# Patient Record
Sex: MALE | Race: WHITE
[De-identification: names, ages, dates, MRNs, and addresses within clinical notes are randomized per-mention and may not be internally consistent; named-entity substitution may affect disease eponyms.]

---

## 2019-11-11 LAB
ANION GAP SERPL CALC-SCNC: 12.5 MMOL/L (ref 8–16)
BASOPHILS # BLD AUTO: 0 10*3/UL (ref 0–0.1)
BASOPHILS NFR BLD AUTO: 0.3 % (ref 0–1)
BUN SERPL-MCNC: 14 MG/DL (ref 7–26)
BUN/CREAT SERPL: 12 (ref 6–25)
CALCIUM SERPL-MCNC: 9.9 MG/DL (ref 8.4–10.2)
CHLORIDE SERPL-SCNC: 101 MMOL/L (ref 98–107)
CO2 SERPL-SCNC: 28 MMOL/L (ref 22–29)
DEPRECATED NEUTROPHILS # BLD AUTO: 4.1 10*3/UL (ref 2.1–6.9)
EGFRCR SERPLBLD CKD-EPI 2021: > 60 ML/MIN (ref 60–?)
EOSINOPHIL # BLD AUTO: 0.1 10*3/UL (ref 0–0.4)
EOSINOPHIL NFR BLD AUTO: 0.9 % (ref 0–6)
ERYTHROCYTE [DISTWIDTH] IN CORD BLOOD: 12.6 % (ref 11.7–14.4)
GLUCOSE SERPLBLD-MCNC: 88 MG/DL (ref 74–118)
HCT VFR BLD AUTO: 47.3 % (ref 38.2–49.6)
HGB BLD-MCNC: 15.7 G/DL (ref 14–18)
LYMPHOCYTES # BLD: 1.6 10*3/UL (ref 1–3.2)
LYMPHOCYTES NFR BLD AUTO: 24.3 % (ref 18–39.1)
MCH RBC QN AUTO: 31.4 PG (ref 28–32)
MCHC RBC AUTO-ENTMCNC: 33.2 G/DL (ref 31–35)
MCV RBC AUTO: 94.6 FL (ref 81–99)
MONOCYTES # BLD AUTO: 0.7 10*3/UL (ref 0.2–0.8)
MONOCYTES NFR BLD AUTO: 10.9 % (ref 4.4–11.3)
NEUTS SEG NFR BLD AUTO: 63.1 % (ref 38.7–80)
PLATELET # BLD AUTO: 229 X10E3/UL (ref 140–360)
POTASSIUM SERPL-SCNC: 4.5 MMOL/L (ref 3.5–5.1)
RBC # BLD AUTO: 5 X10E6/UL (ref 4.3–5.7)
SODIUM SERPL-SCNC: 137 MMOL/L (ref 136–145)

## 2019-11-12 NOTE — DIAGNOSTIC IMAGING REPORT
EXAMINATION:  CHEST 2 VIEWS    



INDICATION: Pre-operative



COMPARISON: None

     

FINDINGS:



LINES/TUBES:None



LUNGS:The lungs are well-inflated. No focal consolidation or pulmonary edema.



PLEURA:No pleural effusion or pneumothorax.



MEDIASTINUM:The cardiomediastinal silhouette appears normal in size and shape.

Atherosclerotic calcifications of the thoracic aorta.



BONES/SOFT TISSUES:No acute osseous injury.



ABDOMEN:No free air under the diaphragm.





IMPRESSION: 

No focal pneumonia or pulmonary edema.



Signed by: Geri Pitts MD on 11/12/2019 12:10 PM

## 2019-11-15 ENCOUNTER — HOSPITAL ENCOUNTER (OUTPATIENT)
Dept: HOSPITAL 88 - OR | Age: 69
Discharge: HOME | End: 2019-11-15
Attending: SURGERY
Payer: COMMERCIAL

## 2019-11-15 VITALS — DIASTOLIC BLOOD PRESSURE: 84 MMHG | SYSTOLIC BLOOD PRESSURE: 134 MMHG

## 2019-11-15 DIAGNOSIS — Z01.810: ICD-10-CM

## 2019-11-15 DIAGNOSIS — Z01.811: ICD-10-CM

## 2019-11-15 DIAGNOSIS — Z01.812: ICD-10-CM

## 2019-11-15 DIAGNOSIS — K40.90: Primary | ICD-10-CM

## 2019-11-15 PROCEDURE — 71046 X-RAY EXAM CHEST 2 VIEWS: CPT

## 2019-11-15 PROCEDURE — 80048 BASIC METABOLIC PNL TOTAL CA: CPT

## 2019-11-15 PROCEDURE — 36415 COLL VENOUS BLD VENIPUNCTURE: CPT

## 2019-11-15 PROCEDURE — 85025 COMPLETE CBC W/AUTO DIFF WBC: CPT

## 2019-11-15 PROCEDURE — 93005 ELECTROCARDIOGRAM TRACING: CPT

## 2019-11-15 PROCEDURE — 49505 PRP I/HERN INIT REDUC >5 YR: CPT

## 2019-11-15 NOTE — XMS REPORT
Patient Summary Document

                             Created on: 11/15/2019



DANIEL CRUZ

External Reference #: [id number]

: 1950

Sex: Male



Demographics







                          Address                   [id number] [place] [id number]

 

                          Home Phone                [phone]

 

                          Preferred Language        Unknown

 

                          Marital Status            Unknown

 

                          Congregational Affiliation     Unknown

 

                          Race                      Unknown

 

                                        Additional Race(s)  

 

                          Ethnic Group              Unknown





Author







                          Author                    [facility]

 

                          [facility]

 

                          Address                   Unknown

 

                          Phone                     Unavailable







Care Team Providers







                    Care Team Member Name    Role                Phone

 

                    JAILENE PETTIT    Unavailable         Unavailable







Problems

This patient has no known problems.



Allergies, Adverse Reactions, Alerts

This patient has no known allergies or adverse reactions.



Medications

This patient has no known medications.



Results







           Test Description    Test Time    Test Comments    Text Results    Atomic Results    Result

 Comments

 

                CHEST 2 VIEWS    2019 12:10:00                                                             

                                             [place] [address]  
   Patient Name: DANIEL CRUZ                                   MR #: 
[id number]                     : 1950                                  
Age/Sex: 69/M  Acct #: [id number]                              Req #: [id number]
[id number]  Adm Physician:                                                      
Ordered by: SERGO PETTIT MD                            Report #: [id number]
       Location: OR                                      Room/Bed:              
      
___________________________________________________________________________________________________
   Procedure: [id number] DX/CHEST 2 VIEWS  Exam Date:                            
Exam Time:                                               REPORT STATUS: Signed  
 EXAMINATION:  CHEST 2 VIEWS          INDICATION: Pre-operative      COMPARISON:
None           FINDINGS:      LINES/TUBES:None      LUNGS:The lungs are well-
inflated. No focal consolidation or pulmonary edema.      PLEURA:No pleural 
effusion or pneumothorax.      MEDIASTINUM:The cardiomediastinal silhouette 
appears normal in size and shape.   Atherosclerotic calcifications of the 
thoracic aorta.      BONES/SOFT TISSUES:No acute osseous injury.      ABDOMEN:No
free air under the diaphragm.         IMPRESSION:    No focal pneumonia or 
pulmonary edema.      Signed by: Lm Pitts MD on 2019 12:10 PM        
Dictated By: LM PITTS MD  Electronically Signed By: LM PITTS MD on 19 
1210  Transcribed By: HERMELINDA on 19 1210       COPY TO:   SERGO PETTIT MD

## 2019-11-15 NOTE — OPERATIVE REPORT
DATE OF PROCEDURE:  11/15/2019

 

SURGEON:  Dayton Jiménez MD

 

PREOPERATIVE DIAGNOSIS:  Left inguinal hernia.

 

POSTOPERATIVE DIAGNOSIS:  Left inguinal hernia.

 

OPERATION PERFORMED:  Repair of left inguinal hernia with large Prolene hernia system.

 

ASSISTANT:  XAVIER Carl.

 

ANESTHESIA:  General endotracheal.

 

COMPLICATIONS:  None.

 

ESTIMATED BLOOD LOSS:  Minimal.

 

DESCRIPTION OF PROCEDURE:  With the patient lying in bed in the supine position under

good general anesthesia, the abdomen was prepped with Betadine solution and draped in

the usual manner.  A left inguinal incision was made and was carried down through the

subcutaneous tissue down to the external oblique aponeurosis.  External oblique was then

opened along the length of its fibers and external inguinal ring was opened.  The cord

was then mobilized and retracted contained within the cord was a large indirect hernia

sac, which was  from the cord structures ligated with a 2-0 silk suture

ligature and a 2-0 silk tie.  The excess was resected.  The preperitoneal space was then

entered through the internal ring and a pocket was created without any difficulty.  A

large Prolene hernia system was then placed in the preperitoneal space and the underlay

patch was deployed without any problems.  The overlay patch was then placed over the

floor and split inferolaterally to allow for passage of the cord.  The mesh was then

sutured to the conjoined tendon and the inguinal ligament using interrupted sutures of

2-0 Vicryl.  The whole area was thoroughly irrigated.  Perfect hemostasis was

ascertained.  All layers were infiltrated on the way out with solution of 0.25% Marcaine

and 1% lidocaine mixed in equal parts.  The external oblique aponeurosis was then closed

with a running suture of 2-0 Vicryl.  The subcutaneous tissue was approximated with 3-0

plain and the skin was closed with clips.  A dressing was applied.  The sponge, lap, and

needle count was correct.  The patient tolerated the procedure well and returned to the

recovery room in stable condition. 

 

 

 

 

______________________________

Dayton Jiménez MD

 

JLR/MODL

D:  11/15/2019 10:49:05

T:  11/15/2019 17:25:35

Job #:  [id number]